# Patient Record
Sex: MALE | Race: BLACK OR AFRICAN AMERICAN
[De-identification: names, ages, dates, MRNs, and addresses within clinical notes are randomized per-mention and may not be internally consistent; named-entity substitution may affect disease eponyms.]

---

## 2017-04-03 ENCOUNTER — HOSPITAL ENCOUNTER (EMERGENCY)
Dept: HOSPITAL 36 - ER | Age: 42
Discharge: HOME | End: 2017-04-03
Payer: SELF-PAY

## 2017-04-03 DIAGNOSIS — J02.8: Primary | ICD-10-CM

## 2017-04-03 DIAGNOSIS — B96.89: ICD-10-CM

## 2017-04-03 PROCEDURE — 99284 EMERGENCY DEPT VISIT MOD MDM: CPT

## 2017-04-03 PROCEDURE — 96372 THER/PROPH/DIAG INJ SC/IM: CPT

## 2017-04-03 PROCEDURE — Z7502: HCPCS

## 2017-04-03 NOTE — ED PHYSICIAN CHART
Chief Complaint/HPI





- Patient Information


Date Seen:: 04/03/17


Time Seen:: 13:57


Chief Complaint:: throat pain


History of Present Illness:: 





41-year-old male, otherwise healthy, complains of acute, worsening, constant, 

severe, 10 out of 10, aching, worse with swallowing, nonradiating, throat pain 

since Thursday.  Associated upper respiratory congestion.


Historian:: Patient


Review:: Nurse's Note Reviewed





Review of Systems





- Review of Systems


Other: Complete system review otherwise unremarkable except as noted in history 

of present illness.





Past Medical History





- Past Medical History


Past Medical History: No significant medical hx


Family History: None


Social History: Non Smoker, No Alcohol, No Drug Use, Employed


Surgical History: None


Psychiatricy History: None


Medication: None





Family Medical History





- Family Member


  ** Mother


History Unknown: Yes





Physical Exam





- Physical Examination


Other:: 





INITIAL VITAL SIGNS: Reviewed by me


GENERAL: Alert and interactive. No acute distress


HEAD: Head is normocephalic and atraumatic


EYES: EOMI. PERRL. No scleral icterus. No conjunctival injection


ENT: Severe pharynx is erythematous and tonsils are edematous with slightly 

today.


NECK: Supple.  The lateral cervical adenopathy greater on the left than right.  

Full range of motion


RESPIRATORY: No tachypnea. Clear breath sounds bilaterally. No wheezing, rales, 

or rhonchi


CV: Regular rate and rhythm. No murmurs, rubs, or gallops


ABDOMEN: Soft, non-distended, non-tender. No guarding. No rebound. No masses. 


EXTREMITIES: No deformity. No cyanosis. No edema. 


SKIN: Warm and dry. No obvious rashes. 


NEUROLOGIC: Alert and oriented. Face is symmetric. Speech is normal. Moves all 

extremities equally. Motor and sensory distally intact. 





ED Septic Shock





- .


Is Septic Shock (SBP<90, OR Lactate>4 mmol\L) present?: No





Reassessment (Disposition)





- Reassessment


Reassessment:: 





Patient has acute throat pain due to acute pharyngitis.  Treat definitively 

here in the ER.  Gave intramuscular Decadron, Toradol, Bicillin 1.2 million 

units.  Also give prescription for ibuprofen for comfort.  Follow-up PCP 1-2 

days.  Return to ER precautions were given.  Patient understands and agrees the 

plan.


Reassessment Condition:: Improved





- Diagnosis


Diagnosis:: 





Acute throat pain due to acute pharyngitis, bacterial, unspecified





- Aftercare/Follow up Instructions


Aftercare/Follow-Up Instructions:: Counseled pt regarding lab results/diagnosis 

& need follow up, Refer to Discharge Instructions


Medication Prescribed:: 





Ibuprofen





- Patient Disposition


Time:: 14:31


Condition at Disposition:: Improved





ED Discharge Plan





- Patient Disposition


Admit/Discharge/Transfer: PT DISCHARGED HOME


Condition at Disposition: Improved


Instructions:  Viral and Bacterial Pharyngitis

## 2018-09-18 ENCOUNTER — HOSPITAL ENCOUNTER (EMERGENCY)
Dept: HOSPITAL 36 - ER | Age: 43
LOS: 1 days | Discharge: HOME | End: 2018-09-19
Payer: MEDICAID

## 2018-09-18 DIAGNOSIS — N23: ICD-10-CM

## 2018-09-18 DIAGNOSIS — N20.9: Primary | ICD-10-CM

## 2018-09-18 LAB
AMPHET UR-MCNC: NEGATIVE NG/ML
APPEARANCE UR: CLEAR
BARBITURATES UR-MCNC: NEGATIVE UG/ML
BENZODIAZEPINES PNL UR: NEGATIVE
BILIRUB UR-MCNC: NEGATIVE MG/DL
CANNABINOIDS SERPL QL CFM: NEGATIVE
COCAINE METAB.OTHER UR-MCNC: NEGATIVE NG/ML
COLOR UR: YELLOW
GLUCOSE UR STRIP-MCNC: NEGATIVE MG/DL
KETONES UR STRIP-MCNC: NEGATIVE MG/DL
LEUKOCYTE ESTERASE UR-ACNC: NEGATIVE
METHADONE UR CFM-MCNC: NEGATIVE NG/ML
METHAMPHET UR QL: NEGATIVE
MICRO URNS: NO
NITRITE UR QL STRIP: NEGATIVE
OPIATES UR QL: NEGATIVE
PCP UR-MCNC: NEGATIVE UG/L
PH UR STRIP: 7 [PH] (ref 4.6–8)
PROT UR STRIP-MCNC: NEGATIVE MG/DL
RBC # UR STRIP: NEGATIVE /UL
SP GR UR STRIP: <= 1.005 (ref 1–1.03)
TRICYCLICS UR QL: NEGATIVE
URINALYSIS COMPLETE PNL UR: (no result)
UROBILINOGEN UR STRIP-ACNC: 0.2 E.U./DL (ref 0.2–1)

## 2018-09-18 PROCEDURE — 81003 URINALYSIS AUTO W/O SCOPE: CPT

## 2018-09-18 PROCEDURE — 72110 X-RAY EXAM L-2 SPINE 4/>VWS: CPT

## 2018-09-18 PROCEDURE — 99285 EMERGENCY DEPT VISIT HI MDM: CPT

## 2018-09-18 PROCEDURE — 74176 CT ABD & PELVIS W/O CONTRAST: CPT

## 2018-09-18 PROCEDURE — 80307 DRUG TEST PRSMV CHEM ANLYZR: CPT

## 2018-09-18 PROCEDURE — 96372 THER/PROPH/DIAG INJ SC/IM: CPT

## 2018-09-19 NOTE — DIAGNOSTIC IMAGING REPORT
Lumbar spine (3 views)



HISTORY: Pain



Alignment is normal.  Degenerative changes are seen about the L5-S1

level with spur formation about the vertebral endplates.  Hypertrophic

changes seen about the facet joints at this level.  No acute

abnormalities.



IMPRESSION:

1.  Degenerative changes L5-S1

2.  No acute abnormalities

## 2018-09-19 NOTE — DIAGNOSTIC IMAGING REPORT
CT scan abdomen and pelvis without intravenous contrast



HISTORY: Pain



Total DLP equals    



CTDI equals    



Axial sections were obtained from the xiphoid process down to the pubic

symphysis.



The liver exhibits a homogeneous parenchyma.  No focal lesions.  The

spleen appears normal.  There is a somewhat distended debris-filled

stomach.  Significance should be correlated clinically.  No focal

abnormality seen in the region of the pancreas.  No focal renal lesions.



The exam of the pelvis demonstrates preservation of normal fat planes. 

No abnormal soft tissue masses or abnormal fluid collections.  No bowel

dilatation.



IMPRESSION:

1.  Somewhat distended debris-filled stomach.  Significance should be

correlated clinically.  No other acute abnormalities

## 2018-09-30 ENCOUNTER — HOSPITAL ENCOUNTER (EMERGENCY)
Dept: HOSPITAL 36 - ER | Age: 43
Discharge: HOME | End: 2018-09-30
Payer: MEDICAID

## 2018-09-30 DIAGNOSIS — M54.2: ICD-10-CM

## 2018-09-30 DIAGNOSIS — J06.9: Primary | ICD-10-CM

## 2018-09-30 DIAGNOSIS — M79.1: ICD-10-CM

## 2018-09-30 LAB
ALBUMIN SERPL-MCNC: 4.3 GM/DL (ref 4.2–5.5)
ALBUMIN/GLOB SERPL: 1.7 {RATIO} (ref 1–1.8)
ALP SERPL-CCNC: 77 U/L (ref 34–104)
ALT SERPL-CCNC: 22 U/L (ref 7–52)
ANION GAP SERPL CALC-SCNC: 10.4 MMOL/L (ref 7–16)
AST SERPL-CCNC: 21 U/L (ref 13–39)
BASOPHILS # BLD AUTO: 0 TH/CUMM (ref 0–0.2)
BASOPHILS NFR BLD AUTO: 0.3 % (ref 0–2)
BILIRUB SERPL-MCNC: 0.5 MG/DL (ref 0.3–1)
BUN SERPL-MCNC: 13 MG/DL (ref 7–25)
CALCIUM SERPL-MCNC: 9.3 MG/DL (ref 8.6–10.3)
CHLORIDE SERPL-SCNC: 104 MEQ/L (ref 98–107)
CO2 SERPL-SCNC: 26.5 MEQ/L (ref 21–31)
CREAT SERPL-MCNC: 1.2 MG/DL (ref 0.7–1.3)
EOSINOPHIL # BLD AUTO: 0.3 TH/CMM (ref 0.1–0.4)
EOSINOPHIL NFR BLD AUTO: 5.2 % (ref 0–5)
ERYTHROCYTE [DISTWIDTH] IN BLOOD BY AUTOMATED COUNT: 11.9 % (ref 11.5–20)
GLOBULIN SER-MCNC: 2.6 GM/DL
GLUCOSE SERPL-MCNC: 100 MG/DL (ref 70–105)
HCT VFR BLD CALC: 47.8 % (ref 41–60)
HGB BLD-MCNC: 16 GM/DL (ref 12–16)
LYMPHOCYTE AB SER FC-ACNC: 1.8 TH/CMM (ref 1.5–3)
LYMPHOCYTES NFR BLD AUTO: 28.3 % (ref 20–50)
MCH RBC QN AUTO: 30.1 PG (ref 26–30)
MCHC RBC AUTO-ENTMCNC: 33.5 PG (ref 28–36)
MCV RBC AUTO: 90.1 FL (ref 80–99)
MONOCYTES # BLD AUTO: 0.7 TH/CMM (ref 0.3–1)
MONOCYTES NFR BLD AUTO: 11 % (ref 2–10)
NEUTROPHILS # BLD: 3.4 TH/CMM (ref 1.8–8)
NEUTROPHILS NFR BLD AUTO: 55.2 % (ref 40–80)
PLATELET # BLD: 216 TH/CMM (ref 150–400)
PMV BLD AUTO: 8 FL
POTASSIUM SERPL-SCNC: 3.9 MEQ/L (ref 3.5–5.1)
RBC # BLD AUTO: 5.31 MIL/CMM (ref 4.3–5.7)
SODIUM SERPL-SCNC: 137 MEQ/L (ref 136–145)
WBC # BLD AUTO: 6.2 TH/CMM (ref 4.8–10.8)

## 2018-09-30 PROCEDURE — Z7502: HCPCS

## 2018-09-30 PROCEDURE — Z7610: HCPCS

## 2018-09-30 NOTE — ED PHYSICIAN CHART
ED Chief Complaint/HPI





- Patient Information


Date Seen:: 09/30/18


Time Seen:: 19:47


Chief Complaint:: Fever and post nasal drainage


History of Present Illness:: 


42 yo male had fever up to 102.5, sore throat and post nasal drainage for 3 

days. Patient stated that cough would worsen the sore throat. 


Allergies:: 


 Allergies











Allergy/AdvReac Type Severity Reaction Status Date / Time


 


No Known Allergies Allergy   Verified 09/18/18 22:10














ED Review of Systems





- Review of Systems


General/Constitutional: Fever, Chills


Skin: No rash


Head: Headache


Eyes: No pain


ENT: No earache, Nasal drainage, Sore throat


Neck: Neck pain


Cardio Vascular: No chest pain


Pulmonary: No SOB


GI: No nausea, No vomiting


Musculoskeletal: Bone or joint pain, Muscle pain


Neurological: No focal symptoms





ED Past Medical History





- Past Medical History


Past Medical History: Other (Neck pain )


Social History: Non Smoker, No Alcohol, Illicit Drug Use (marijuana)


Surgical History: None





Family Medical History





- Family Member


  ** Mother


History Unknown: Yes





ED Physical Exam





- Physical Examination


General/Constitutional: Awake


Head: Atraumatic


Eyes: PERRL


Skin: No skin lesions


Other ENMT comments:: 


Oropharyngeal erythema


Neck: No nuchal rigidity


Respiratory: No Wheeze/Rhonchi/Rales


Cardio Vascular: RRR, No murmur, gallop, rubs, NL S1 S2


GI: No tenderness/rebounding/guarding


Extremities: normal strength in all extremities


Neuro/Psych: Normal motor strength





ED Assessment





- Assessment


General Assessment: 


Upper respiratory infection likely due to viral origin





Assessment/Comments:: 


CBC, CMP


Tylenol 650mg po


D/c home


Keep hydration


F/u PCP or return to ER if symptoms worsen





ED Septic Shock





- .


Is Septic Shock (SBP<90, OR Lactate>4 mmol\L) present?: No





ED Reassessment (Disposition)





- Reassessment


Reassessment Condition:: Improved





- Patient Disposition


Discharge/Transfer:: Home

## 2019-01-19 NOTE — ED PHYSICIAN CHART
ED Chief Complaint/HPI





- Patient Information


Date Seen:: 09/18/18


Time Seen:: 22:11


Chief Complaint:: Low back pain


History of Present Illness:: 


44 yo male had right flank pain and muscle spasm for 3 days. Lying supine 

relieved pain. Patient denied any leg pain, numbness or dysuria. Patient had 

history of low back pain every 6 months for 5 years. Patient described this 

time the pain was worst.


Allergies:: 


 Allergies











Allergy/AdvReac Type Severity Reaction Status Date / Time


 


No Known Allergies Allergy   Verified 09/18/18 22:10














ED Review of Systems





- Review of Systems


General/Constitutional: No fever


Skin: No skin lesions


Head: No headache


Eyes: No pain


ENT: No nasal drainage


Neck: No neck pain


Cardio Vascular: No chest pain


Pulmonary: No SOB


GI: Other (bloating)


G/U: No dysuria


Musculoskeletal: Back pain


Neurological: No syncope, No focal symptoms, No paresthesia





ED Past Medical History





- Past Medical History


Past Medical History: Other (low back pain)


Social History: Non Smoker, Alcohol (drinks 5-6 beers a week), Illicit Drug Use 

(marijuana)


Surgical History: other (Left shoulder reconstruction surgery, left eye lazy 

eye surgery)





Family Medical History





- Family Member


  ** Mother


History Unknown: Yes





ED Physical Exam





- Physical Examination


General/Constitutional: Awake, Alert


Head: Atraumatic


Eyes: PERRL


Skin: No skin lesions


ENMT: Nasal exam nl


Neck: No nuchal rigidity


Respiratory: No Wheeze/Rhonchi/Rales


Cardio Vascular: RRR, No murmur, gallop, rubs, NL S1 S2


GI: No tenderness/rebounding/guarding


Other GI comments:: 


No CVA percussion tenderness


Other Extremities comments:: 


Low back tenderness, with painful and limited ROM


Neuro/Psych: Normal motor strength





ED Labs/Radiology/EKG Results





- Radiology Results


Results: 


Lumbar spine X ray: moderate degenerative change at L5-S1 level.


CT abdomen/pelvis: No obstructing right urinary stones, minimally prominent 

left renal collecting system and left ureter, which could reflect recently 

passed stone.





ED Assessment





- Assessment


General Assessment: 


Urinary stones


Renal colic


Assessment/Comments:: 


UA, urine drug screen


L-spine X ray


CT abdomen/pelvis


Toradol 60mg IM


D/c home


Tamsulosin


F/u PCP and urologist, or return to ER if symptoms worsen





ED Septic Shock





- .


Is Septic Shock (SBP<90, OR Lactate>4 mmol\L) present?: No





ED Reassessment (Disposition)





- Reassessment


Reassessment Condition:: Improved





- Aftercare/Follow up Instructions


Medication Prescribed:: 


Tamsulosin 0.4mg PO QD #30





- Patient Disposition


Discharge/Transfer:: Home Patient